# Patient Record
Sex: FEMALE | Race: WHITE | NOT HISPANIC OR LATINO | Employment: FULL TIME | URBAN - NONMETROPOLITAN AREA
[De-identification: names, ages, dates, MRNs, and addresses within clinical notes are randomized per-mention and may not be internally consistent; named-entity substitution may affect disease eponyms.]

---

## 2017-12-01 ENCOUNTER — TRANSCRIBE ORDERS (OUTPATIENT)
Dept: ADMINISTRATIVE | Facility: HOSPITAL | Age: 52
End: 2017-12-01

## 2017-12-01 DIAGNOSIS — Z12.31 VISIT FOR SCREENING MAMMOGRAM: Primary | ICD-10-CM

## 2018-01-02 ENCOUNTER — APPOINTMENT (OUTPATIENT)
Dept: MAMMOGRAPHY | Facility: HOSPITAL | Age: 53
End: 2018-01-02

## 2018-01-22 ENCOUNTER — HOSPITAL ENCOUNTER (OUTPATIENT)
Dept: MAMMOGRAPHY | Facility: HOSPITAL | Age: 53
Discharge: HOME | End: 2018-01-22
Admitting: NURSE PRACTITIONER

## 2018-01-22 DIAGNOSIS — Z12.31 VISIT FOR SCREENING MAMMOGRAM: ICD-10-CM

## 2018-01-22 PROCEDURE — 77067 SCR MAMMO BI INCL CAD: CPT

## 2018-01-22 PROCEDURE — 77063 BREAST TOMOSYNTHESIS BI: CPT | Performed by: RADIOLOGY

## 2018-01-22 PROCEDURE — 77063 BREAST TOMOSYNTHESIS BI: CPT

## 2018-01-22 PROCEDURE — 77067 SCR MAMMO BI INCL CAD: CPT | Performed by: RADIOLOGY

## 2019-02-14 ENCOUNTER — HOSPITAL ENCOUNTER (OUTPATIENT)
Dept: MAMMOGRAPHY | Facility: HOSPITAL | Age: 54
Discharge: HOME | End: 2019-02-14
Admitting: FAMILY MEDICINE

## 2019-02-14 ENCOUNTER — HOSPITAL ENCOUNTER (OUTPATIENT)
Dept: BONE DENSITY | Facility: HOSPITAL | Age: 54
Discharge: HOME | End: 2019-02-14

## 2019-02-14 DIAGNOSIS — M81.0 AGE-RELATED OSTEOPOROSIS WITHOUT CURRENT PATHOLOGICAL FRACTURE: ICD-10-CM

## 2019-02-14 DIAGNOSIS — Z12.39 SCREENING BREAST EXAMINATION: ICD-10-CM

## 2019-02-14 PROCEDURE — 77063 BREAST TOMOSYNTHESIS BI: CPT

## 2019-02-14 PROCEDURE — 77080 DXA BONE DENSITY AXIAL: CPT

## 2019-02-14 PROCEDURE — 77067 SCR MAMMO BI INCL CAD: CPT

## 2019-02-14 PROCEDURE — 77063 BREAST TOMOSYNTHESIS BI: CPT | Performed by: RADIOLOGY

## 2019-02-14 PROCEDURE — 77067 SCR MAMMO BI INCL CAD: CPT | Performed by: RADIOLOGY

## 2019-02-14 PROCEDURE — 77080 DXA BONE DENSITY AXIAL: CPT | Performed by: RADIOLOGY

## 2020-04-02 ENCOUNTER — APPOINTMENT (OUTPATIENT)
Dept: MAMMOGRAPHY | Facility: HOSPITAL | Age: 55
End: 2020-04-02

## 2020-04-02 ENCOUNTER — APPOINTMENT (OUTPATIENT)
Dept: BONE DENSITY | Facility: HOSPITAL | Age: 55
End: 2020-04-02

## 2021-07-01 ENCOUNTER — HOSPITAL ENCOUNTER (OUTPATIENT)
Dept: MAMMOGRAPHY | Facility: HOSPITAL | Age: 56
Discharge: HOME | End: 2021-07-01
Admitting: NURSE PRACTITIONER

## 2021-07-01 DIAGNOSIS — Z12.31 VISIT FOR SCREENING MAMMOGRAM: ICD-10-CM

## 2021-07-01 PROCEDURE — 77067 SCR MAMMO BI INCL CAD: CPT | Performed by: RADIOLOGY

## 2021-07-01 PROCEDURE — 77063 BREAST TOMOSYNTHESIS BI: CPT | Performed by: RADIOLOGY

## 2021-07-01 PROCEDURE — 77063 BREAST TOMOSYNTHESIS BI: CPT

## 2021-07-01 PROCEDURE — 77067 SCR MAMMO BI INCL CAD: CPT

## 2022-07-29 ENCOUNTER — APPOINTMENT (OUTPATIENT)
Dept: MAMMOGRAPHY | Facility: HOSPITAL | Age: 57
End: 2022-07-29

## 2022-07-29 ENCOUNTER — APPOINTMENT (OUTPATIENT)
Dept: BONE DENSITY | Facility: HOSPITAL | Age: 57
End: 2022-07-29

## 2022-08-05 ENCOUNTER — HOSPITAL ENCOUNTER (OUTPATIENT)
Dept: MAMMOGRAPHY | Facility: HOSPITAL | Age: 57
Discharge: HOME | End: 2022-08-05

## 2022-08-05 ENCOUNTER — HOSPITAL ENCOUNTER (OUTPATIENT)
Dept: BONE DENSITY | Facility: HOSPITAL | Age: 57
Discharge: HOME | End: 2022-08-05

## 2022-08-05 DIAGNOSIS — Z78.0 POSTMENOPAUSAL STATUS (AGE-RELATED) (NATURAL): ICD-10-CM

## 2022-08-05 DIAGNOSIS — Z12.31 VISIT FOR SCREENING MAMMOGRAM: ICD-10-CM

## 2022-08-05 PROCEDURE — 77080 DXA BONE DENSITY AXIAL: CPT

## 2022-08-05 PROCEDURE — 77063 BREAST TOMOSYNTHESIS BI: CPT

## 2022-08-05 PROCEDURE — 77080 DXA BONE DENSITY AXIAL: CPT | Performed by: RADIOLOGY

## 2022-08-05 PROCEDURE — 77067 SCR MAMMO BI INCL CAD: CPT

## 2022-08-05 PROCEDURE — 77063 BREAST TOMOSYNTHESIS BI: CPT | Performed by: RADIOLOGY

## 2022-08-05 PROCEDURE — 77067 SCR MAMMO BI INCL CAD: CPT | Performed by: RADIOLOGY

## 2023-07-27 ENCOUNTER — TRANSCRIBE ORDERS (OUTPATIENT)
Dept: ADMINISTRATIVE | Facility: HOSPITAL | Age: 58
End: 2023-07-27
Payer: COMMERCIAL

## 2023-07-27 DIAGNOSIS — Z78.0 POSTMENOPAUSAL: Primary | ICD-10-CM

## 2023-07-27 DIAGNOSIS — Z12.31 VISIT FOR SCREENING MAMMOGRAM: ICD-10-CM

## 2023-08-01 ENCOUNTER — OFFICE VISIT (OUTPATIENT)
Dept: SURGERY | Facility: CLINIC | Age: 58
End: 2023-08-01
Payer: COMMERCIAL

## 2023-08-01 VITALS
HEIGHT: 62 IN | HEART RATE: 71 BPM | DIASTOLIC BLOOD PRESSURE: 80 MMHG | WEIGHT: 124 LBS | SYSTOLIC BLOOD PRESSURE: 118 MMHG | BODY MASS INDEX: 22.82 KG/M2

## 2023-08-01 DIAGNOSIS — Z12.11 SCREENING FOR COLON CANCER: Primary | ICD-10-CM

## 2023-08-01 RX ORDER — ESTRADIOL 0.05 MG/D
PATCH TRANSDERMAL
COMMUNITY
Start: 2013-08-21

## 2023-08-01 RX ORDER — SODIUM, POTASSIUM,MAG SULFATES 17.5-3.13G
2 SOLUTION, RECONSTITUTED, ORAL ORAL TAKE AS DIRECTED
Qty: 354 ML | Refills: 0 | Status: SHIPPED | OUTPATIENT
Start: 2023-08-01

## 2023-08-01 RX ORDER — ACETAMINOPHEN AND CODEINE PHOSPHATE 300; 30 MG/1; MG/1
1 TABLET ORAL 3 TIMES DAILY
COMMUNITY
Start: 2023-07-17

## 2023-08-01 RX ORDER — CELECOXIB 200 MG/1
1 CAPSULE ORAL DAILY
COMMUNITY
Start: 2023-07-14

## 2023-08-01 RX ORDER — METHOCARBAMOL 500 MG/1
1 TABLET, FILM COATED ORAL EVERY 12 HOURS SCHEDULED
COMMUNITY
Start: 2023-06-06

## 2023-08-01 NOTE — H&P (VIEW-ONLY)
Subjective   Iva Martins is a 57 y.o. female who presents today for Initial Evaluation    Chief Complaint:    Chief Complaint   Patient presents with    Colonoscopy        History of Present Illness:    History of Present Illness Iva is a 57-year-old female who presents for screening colonoscopy.  Reports her last colonoscopy was approximately 7 to 8 years ago.  States that she did have several benign polyps removed.  Reports that she was told to return for repeat colonoscopy in 5 years.  Patient reports that she has daily bowel movements.  Denies any blood in her stool.  Denies any known family history of colon cancer.  She denies any unintentional weight loss or any changes in her bowel habits.  Patient reports that she has had an inguinal hernia repair to the left and the right in the past.  States that occasionally when she works and lifts and tugs, she will notice she has some discomfort to her left lower abdomen/pelvis area in which she believes that she may have some scar tissue.  She reports that she does not feel another inguinal hernia.  States that sometimes she feels like she has a hardness to that area when she does not have a good bowel movement.  I did discuss the possibility of obtaining a CT scan to further evaluate, patient declined.  Patient also reports she was diagnosed with a hiatal hernia in the past.  States that she does take a stomach pill for reflux when she can remember it.  She states that she also manages her reflux by staying away from certain foods.  She declined having an EGD at this time.    The following portions of the patient's history were reviewed and updated as appropriate: allergies, current medications, past family history, past medical history, past social history, past surgical history and problem list.    Past Medical History:  History reviewed. No pertinent past medical history.    Social History:  Social History     Socioeconomic History    Marital status:     Tobacco Use    Smoking status: Every Day     Packs/day: 1.00     Types: Cigarettes    Smokeless tobacco: Never   Vaping Use    Vaping Use: Some days    Substances: Nicotine, Flavoring    Devices: Pre-filled or refillable cartridge   Substance and Sexual Activity    Alcohol use: Never    Drug use: Never    Sexual activity: Defer       Family History:  Family History   Problem Relation Age of Onset    Breast cancer Neg Hx        Past Surgical History:  Past Surgical History:   Procedure Laterality Date    CYST REMOVAL      GANGLION CYST EXCISION Bilateral     HERNIA REPAIR Bilateral     different dates    HYSTERECTOMY      39    TUMOR EXCISION         Problem List:  There is no problem list on file for this patient.      Allergy:   Allergies   Allergen Reactions    Morphine Hives        Current Medications:   Current Outpatient Medications   Medication Sig Dispense Refill    acetaminophen-codeine (TYLENOL with CODEINE #3) 300-30 MG per tablet Take 1 tablet by mouth 3 (Three) Times a Day.      celecoxib (CeleBREX) 200 MG capsule Take 1 capsule by mouth Daily.      estradiol (CLIMARA) 0.05 MG/24HR patch Place  on the skin as directed by provider.      methocarbamol (ROBAXIN) 500 MG tablet Take 1 tablet by mouth Every 12 (Twelve) Hours.      sodium-potassium-magnesium sulfates (Suprep Bowel Prep Kit) 17.5-3.13-1.6 GM/177ML solution oral solution Take 2 bottles by mouth Take As Directed for 2 doses. 354 mL 0     No current facility-administered medications for this visit.       Review of Systems:    Review of Systems   Constitutional:  Negative for activity change.   HENT:  Negative for congestion.    Eyes:  Negative for blurred vision.   Respiratory:  Negative for shortness of breath.    Cardiovascular:  Negative for chest pain.   Gastrointestinal:  Negative for abdominal pain and blood in stool.   Endocrine: Negative for cold intolerance.   Genitourinary:  Negative for flank pain.   Musculoskeletal:  Negative  "for arthralgias.   Skin:  Negative for bruise.   Allergic/Immunologic: Negative for environmental allergies.   Neurological:  Negative for confusion.   Hematological:  Negative for adenopathy.   Psychiatric/Behavioral:  Negative for agitation.        Physical Exam:   Physical Exam  Constitutional:       Appearance: Normal appearance.   HENT:      Head: Normocephalic and atraumatic.      Right Ear: External ear normal.      Left Ear: External ear normal.   Eyes:      Conjunctiva/sclera: Conjunctivae normal.      Pupils: Pupils are equal, round, and reactive to light.   Cardiovascular:      Rate and Rhythm: Normal rate and regular rhythm.      Pulses: Normal pulses.   Pulmonary:      Effort: Pulmonary effort is normal.   Abdominal:      General: Abdomen is flat. There is no distension.      Palpations: Abdomen is soft.   Musculoskeletal:         General: Normal range of motion.      Cervical back: Normal range of motion and neck supple.   Skin:     General: Skin is warm and dry.      Capillary Refill: Capillary refill takes less than 2 seconds.   Neurological:      General: No focal deficit present.      Mental Status: She is alert and oriented to person, place, and time.   Psychiatric:         Mood and Affect: Mood normal.         Behavior: Behavior normal.       Vitals:  Blood pressure 118/80, pulse 71, height 157.5 cm (62.01\"), weight 56.2 kg (124 lb).   Body mass index is 22.67 kg/mý.         Assessment & Plan   Diagnoses and all orders for this visit:    1. Screening for colon cancer (Primary)  -     Case Request; Standing  -     Case Request    Other orders  -     Follow Anesthesia Guidelines / Protocol; Future  -     Follow Anesthesia Guidelines / Protocol; Standing  -     Verify / Perform Chlorhexidine Skin Prep; Standing  -     Verify / Perform Chlorhexidine Skin Prep if Indicated (If Not Already Completed); Standing  -     Provide NPO Instructions to Patient; Future  -     Chlorhexidine Skin Prep; Future  -  "    Obtain Informed Consent; Future  -     sodium-potassium-magnesium sulfates (Suprep Bowel Prep Kit) 17.5-3.13-1.6 GM/177ML solution oral solution; Take 2 bottles by mouth Take As Directed for 2 doses.  Dispense: 354 mL; Refill: 0      Iva is a 57-year-old female presents for screening colonoscopy.  She will undergo colonoscopy Dr. Castillo.  Verbalized understanding prep instructions and procedure wished to proceed.    Visit Diagnoses:    ICD-10-CM ICD-9-CM   1. Screening for colon cancer  Z12.11 V76.51         MEDS ORDERED DURING VISIT:  New Medications Ordered This Visit   Medications    sodium-potassium-magnesium sulfates (Suprep Bowel Prep Kit) 17.5-3.13-1.6 GM/177ML solution oral solution     Sig: Take 2 bottles by mouth Take As Directed for 2 doses.     Dispense:  354 mL     Refill:  0       Return for Follow-up postop.             This document has been electronically signed by MARTIN De La Paz  August 1, 2023 14:59 EDT    Please note that portions of this note were completed with a voice recognition program.

## 2023-08-09 PROBLEM — Z12.11 SCREENING FOR COLON CANCER: Status: ACTIVE | Noted: 2023-08-09

## 2023-08-11 ENCOUNTER — TELEPHONE (OUTPATIENT)
Dept: SURGERY | Facility: CLINIC | Age: 58
End: 2023-08-11
Payer: COMMERCIAL

## 2023-08-11 NOTE — TELEPHONE ENCOUNTER
Colonoscopy scheduled for Thursday 8/17/23 at 8 am. Patient to be NPO and have a  with her. Clear liquid diet and bowel prep the day prior.

## 2023-08-14 ENCOUNTER — PREP FOR SURGERY (OUTPATIENT)
Dept: OTHER | Facility: HOSPITAL | Age: 58
End: 2023-08-14
Payer: COMMERCIAL

## 2023-08-14 ENCOUNTER — TELEPHONE (OUTPATIENT)
Dept: SURGERY | Facility: CLINIC | Age: 58
End: 2023-08-14
Payer: COMMERCIAL

## 2023-08-16 ENCOUNTER — ANESTHESIA EVENT (OUTPATIENT)
Dept: PERIOP | Facility: HOSPITAL | Age: 58
End: 2023-08-16
Payer: COMMERCIAL

## 2023-08-16 RX ORDER — FAMOTIDINE 10 MG
10 TABLET ORAL DAILY
COMMUNITY

## 2023-08-16 RX ORDER — ERGOCALCIFEROL 1.25 MG/1
1 CAPSULE ORAL WEEKLY
COMMUNITY
Start: 2023-08-02

## 2023-08-17 ENCOUNTER — ANESTHESIA (OUTPATIENT)
Dept: PERIOP | Facility: HOSPITAL | Age: 58
End: 2023-08-17
Payer: COMMERCIAL

## 2023-08-17 ENCOUNTER — HOSPITAL ENCOUNTER (OUTPATIENT)
Facility: HOSPITAL | Age: 58
Setting detail: HOSPITAL OUTPATIENT SURGERY
Discharge: HOME | End: 2023-08-17
Attending: SURGERY | Admitting: SURGERY
Payer: COMMERCIAL

## 2023-08-17 VITALS
BODY MASS INDEX: 22.82 KG/M2 | HEART RATE: 54 BPM | RESPIRATION RATE: 20 BRPM | TEMPERATURE: 97.6 F | DIASTOLIC BLOOD PRESSURE: 82 MMHG | SYSTOLIC BLOOD PRESSURE: 122 MMHG | WEIGHT: 124 LBS | HEIGHT: 62 IN | OXYGEN SATURATION: 99 %

## 2023-08-17 DIAGNOSIS — Z12.11 SCREENING FOR COLON CANCER: ICD-10-CM

## 2023-08-17 PROCEDURE — 45378 DIAGNOSTIC COLONOSCOPY: CPT | Performed by: SURGERY

## 2023-08-17 PROCEDURE — 25010000002 PROPOFOL 200 MG/20ML EMULSION: Performed by: NURSE ANESTHETIST, CERTIFIED REGISTERED

## 2023-08-17 PROCEDURE — 25010000002 MIDAZOLAM PER 1 MG: Performed by: NURSE ANESTHETIST, CERTIFIED REGISTERED

## 2023-08-17 RX ORDER — MIDAZOLAM HYDROCHLORIDE 1 MG/ML
1 INJECTION INTRAMUSCULAR; INTRAVENOUS
Status: DISCONTINUED | OUTPATIENT
Start: 2023-08-17 | End: 2023-08-17 | Stop reason: HOSPADM

## 2023-08-17 RX ORDER — SODIUM CHLORIDE 9 MG/ML
40 INJECTION, SOLUTION INTRAVENOUS AS NEEDED
Status: DISCONTINUED | OUTPATIENT
Start: 2023-08-17 | End: 2023-08-17 | Stop reason: HOSPADM

## 2023-08-17 RX ORDER — LIDOCAINE HYDROCHLORIDE 20 MG/ML
INJECTION, SOLUTION EPIDURAL; INFILTRATION; INTRACAUDAL; PERINEURAL AS NEEDED
Status: DISCONTINUED | OUTPATIENT
Start: 2023-08-17 | End: 2023-08-17 | Stop reason: SURG

## 2023-08-17 RX ORDER — SODIUM CHLORIDE, SODIUM LACTATE, POTASSIUM CHLORIDE, CALCIUM CHLORIDE 600; 310; 30; 20 MG/100ML; MG/100ML; MG/100ML; MG/100ML
125 INJECTION, SOLUTION INTRAVENOUS ONCE
Status: COMPLETED | OUTPATIENT
Start: 2023-08-17 | End: 2023-08-17

## 2023-08-17 RX ORDER — MIDAZOLAM HYDROCHLORIDE 1 MG/ML
INJECTION INTRAMUSCULAR; INTRAVENOUS AS NEEDED
Status: DISCONTINUED | OUTPATIENT
Start: 2023-08-17 | End: 2023-08-17 | Stop reason: SURG

## 2023-08-17 RX ORDER — SODIUM CHLORIDE 0.9 % (FLUSH) 0.9 %
10 SYRINGE (ML) INJECTION EVERY 12 HOURS SCHEDULED
Status: DISCONTINUED | OUTPATIENT
Start: 2023-08-17 | End: 2023-08-17 | Stop reason: HOSPADM

## 2023-08-17 RX ORDER — PROPOFOL 10 MG/ML
INJECTION, EMULSION INTRAVENOUS AS NEEDED
Status: DISCONTINUED | OUTPATIENT
Start: 2023-08-17 | End: 2023-08-17 | Stop reason: SURG

## 2023-08-17 RX ORDER — SODIUM CHLORIDE 0.9 % (FLUSH) 0.9 %
10 SYRINGE (ML) INJECTION AS NEEDED
Status: DISCONTINUED | OUTPATIENT
Start: 2023-08-17 | End: 2023-08-17 | Stop reason: HOSPADM

## 2023-08-17 RX ORDER — KETOROLAC TROMETHAMINE 30 MG/ML
30 INJECTION, SOLUTION INTRAMUSCULAR; INTRAVENOUS EVERY 6 HOURS PRN
Status: DISCONTINUED | OUTPATIENT
Start: 2023-08-17 | End: 2023-08-17 | Stop reason: HOSPADM

## 2023-08-17 RX ORDER — SODIUM CHLORIDE, SODIUM LACTATE, POTASSIUM CHLORIDE, CALCIUM CHLORIDE 600; 310; 30; 20 MG/100ML; MG/100ML; MG/100ML; MG/100ML
125 INJECTION, SOLUTION INTRAVENOUS ONCE
Status: DISCONTINUED | OUTPATIENT
Start: 2023-08-17 | End: 2023-08-17 | Stop reason: HOSPADM

## 2023-08-17 RX ORDER — MEPERIDINE HYDROCHLORIDE 25 MG/ML
12.5 INJECTION INTRAMUSCULAR; INTRAVENOUS; SUBCUTANEOUS
Status: DISCONTINUED | OUTPATIENT
Start: 2023-08-17 | End: 2023-08-17 | Stop reason: HOSPADM

## 2023-08-17 RX ORDER — FENTANYL CITRATE 50 UG/ML
50 INJECTION, SOLUTION INTRAMUSCULAR; INTRAVENOUS
Status: DISCONTINUED | OUTPATIENT
Start: 2023-08-17 | End: 2023-08-17 | Stop reason: HOSPADM

## 2023-08-17 RX ORDER — ONDANSETRON 2 MG/ML
4 INJECTION INTRAMUSCULAR; INTRAVENOUS AS NEEDED
Status: DISCONTINUED | OUTPATIENT
Start: 2023-08-17 | End: 2023-08-17 | Stop reason: HOSPADM

## 2023-08-17 RX ORDER — IPRATROPIUM BROMIDE AND ALBUTEROL SULFATE 2.5; .5 MG/3ML; MG/3ML
3 SOLUTION RESPIRATORY (INHALATION) ONCE AS NEEDED
Status: DISCONTINUED | OUTPATIENT
Start: 2023-08-17 | End: 2023-08-17 | Stop reason: HOSPADM

## 2023-08-17 RX ORDER — SODIUM CHLORIDE, SODIUM LACTATE, POTASSIUM CHLORIDE, CALCIUM CHLORIDE 600; 310; 30; 20 MG/100ML; MG/100ML; MG/100ML; MG/100ML
100 INJECTION, SOLUTION INTRAVENOUS ONCE AS NEEDED
Status: DISCONTINUED | OUTPATIENT
Start: 2023-08-17 | End: 2023-08-17 | Stop reason: HOSPADM

## 2023-08-17 RX ADMIN — SODIUM CHLORIDE, POTASSIUM CHLORIDE, SODIUM LACTATE AND CALCIUM CHLORIDE: 600; 310; 30; 20 INJECTION, SOLUTION INTRAVENOUS at 08:29

## 2023-08-17 RX ADMIN — PROPOFOL 50 MG: 10 INJECTION, EMULSION INTRAVENOUS at 08:43

## 2023-08-17 RX ADMIN — PROPOFOL 50 MG: 10 INJECTION, EMULSION INTRAVENOUS at 08:38

## 2023-08-17 RX ADMIN — PROPOFOL 50 MG: 10 INJECTION, EMULSION INTRAVENOUS at 08:36

## 2023-08-17 RX ADMIN — MIDAZOLAM HYDROCHLORIDE 2 MG: 1 INJECTION, SOLUTION INTRAMUSCULAR; INTRAVENOUS at 08:29

## 2023-08-17 RX ADMIN — PROPOFOL 100 MG: 10 INJECTION, EMULSION INTRAVENOUS at 08:31

## 2023-08-17 RX ADMIN — PROPOFOL 50 MG: 10 INJECTION, EMULSION INTRAVENOUS at 08:52

## 2023-08-17 RX ADMIN — LIDOCAINE HYDROCHLORIDE 100 MG: 20 INJECTION, SOLUTION EPIDURAL; INFILTRATION; INTRACAUDAL; PERINEURAL at 08:31

## 2023-08-17 RX ADMIN — PROPOFOL 50 MG: 10 INJECTION, EMULSION INTRAVENOUS at 08:34

## 2023-08-17 RX ADMIN — PROPOFOL 50 MG: 10 INJECTION, EMULSION INTRAVENOUS at 08:48

## 2023-08-17 RX ADMIN — PROPOFOL 50 MG: 10 INJECTION, EMULSION INTRAVENOUS at 08:40

## 2023-08-17 NOTE — OP NOTE
COLONOSCOPY, ESOPHAGOGASTRODUODENOSCOPY  Procedure Note    Iva Martins  8/17/2023    Pre-op Diagnosis:   Screening for colon cancer [Z12.11]    Post-op Diagnosis: diverticulosis, tortuous colon        Procedure(s):  COLONOSCOPY  ESOPHAGOGASTRODUODENOSCOPY    Surgeon(s):  Kahlil Castillo MD    Anesthesia: Anesthesia type not filed in the log.    Staff:   Circulator: Irma Chance, KYUNG  Endo Technician: Pablo Lin; Tanya Nazario    Estimated Blood Loss: none    Specimens:                Order Name Source Comment Collection Info Order Time   TISSUE EXAM, [facility] ([facility], [facility]) Gastric, Antrum  Collected By: Kahlil Castillo MD 8/17/2023  8:37 AM     How many specimens?   1          Release to patient   Routine Release              Drains: * No LDAs found *    Procedure: The scope passed from the mouth to the duodenum. No ulcers or erosions seen.  A biopsy was done in the antrum. The GE junction and esophagus were unremarkable.   The scope was passed from the rectum to the cecum. The sigmoid was difficult to get past due to being tortuous. There is some diverticulosis but no stricture or inflammation. The ileocecal valve and terminal ileum were unremarkable. No polyps or inflammation was seen throughout the colon.     Findings: tortuous colon, diverticulosis         Complications: none   Grafts / Implants N/A    Kahlil Castillo MD     Date: 8/17/2023  Time: 09:03 EDT

## 2023-08-17 NOTE — ANESTHESIA POSTPROCEDURE EVALUATION
Patient: Iva Martins    Procedure Summary       Date: 08/17/23 Room / Location: [facility] OR [address] [place] [address] COR OR    Anesthesia Start: 0829 Anesthesia Stop: 0856    Procedures:       COLONOSCOPY      ESOPHAGOGASTRODUODENOSCOPY (Esophagus) Diagnosis:       Screening for colon cancer      (Screening for colon cancer [Z12.11])    Surgeons: Kahlil Castillo MD Provider: Clemente Munguia MD    Anesthesia Type: general ASA Status: 2            Anesthesia Type: No value filed.    Vitals  Vitals Value Taken Time   /70 08/17/23 0908   Temp 97.6 øF (36.4 øC) 08/17/23 0858   Pulse 67 08/17/23 0908   Resp 20 08/17/23 0908   SpO2 100 % 08/17/23 0908           Post Anesthesia Care and Evaluation    Patient location during evaluation: PHASE II  Patient participation: complete - patient participated  Level of consciousness: awake and alert  Pain score: 1  Pain management: adequate    Airway patency: patent  Anesthetic complications: No anesthetic complications  PONV Status: controlled  Cardiovascular status: acceptable  Respiratory status: acceptable  Hydration status: acceptable

## 2023-08-17 NOTE — ANESTHESIA PREPROCEDURE EVALUATION
Anesthesia Evaluation     history of anesthetic complications:  PONV  NPO Solid Status: > 8 hours  NPO Liquid Status: > 8 hours           Airway   Mallampati: I  TM distance: >3 FB  Neck ROM: full  No difficulty expected  Dental - normal exam     Pulmonary - normal exam   Cardiovascular - normal exam        Neuro/Psych  GI/Hepatic/Renal/Endo    (+) GERD    Musculoskeletal     Abdominal  - normal exam    Bowel sounds: normal.   Substance History      OB/GYN          Other   arthritis,                   Anesthesia Plan    ASA 2     general     intravenous induction     Anesthetic plan, risks, benefits, and alternatives have been provided, discussed and informed consent has been obtained with: patient.    CODE STATUS:

## 2023-08-18 LAB — REF LAB TEST METHOD: NORMAL

## 2024-10-24 ENCOUNTER — TRANSCRIBE ORDERS (OUTPATIENT)
Dept: ADMINISTRATIVE | Facility: HOSPITAL | Age: 59
End: 2024-10-24
Payer: COMMERCIAL

## 2024-10-24 DIAGNOSIS — Z78.0 ASYMPTOMATIC MENOPAUSAL STATE: ICD-10-CM

## 2024-10-24 DIAGNOSIS — Z12.31 VISIT FOR SCREENING MAMMOGRAM: Primary | ICD-10-CM

## 2024-11-15 ENCOUNTER — HOSPITAL ENCOUNTER (OUTPATIENT)
Dept: BONE DENSITY | Facility: HOSPITAL | Age: 59
Discharge: HOME | End: 2024-11-15
Payer: COMMERCIAL

## 2024-11-15 ENCOUNTER — HOSPITAL ENCOUNTER (OUTPATIENT)
Dept: MAMMOGRAPHY | Facility: HOSPITAL | Age: 59
Discharge: HOME | End: 2024-11-15
Payer: COMMERCIAL

## 2024-11-15 DIAGNOSIS — Z12.31 VISIT FOR SCREENING MAMMOGRAM: ICD-10-CM

## 2024-11-15 DIAGNOSIS — Z78.0 ASYMPTOMATIC MENOPAUSAL STATE: ICD-10-CM

## 2024-11-15 PROCEDURE — 77067 SCR MAMMO BI INCL CAD: CPT

## 2024-11-15 PROCEDURE — 77063 BREAST TOMOSYNTHESIS BI: CPT

## 2024-11-15 PROCEDURE — 77080 DXA BONE DENSITY AXIAL: CPT | Performed by: RADIOLOGY

## 2024-11-15 PROCEDURE — 77080 DXA BONE DENSITY AXIAL: CPT

## 2025-03-25 ENCOUNTER — OFFICE VISIT (OUTPATIENT)
Dept: SURGERY | Facility: CLINIC | Age: 60
End: 2025-03-25
Payer: COMMERCIAL

## 2025-03-25 VITALS — HEIGHT: 67 IN | BODY MASS INDEX: 16.48 KG/M2 | WEIGHT: 105 LBS

## 2025-03-25 DIAGNOSIS — R10.32 LEFT GROIN PAIN: Primary | ICD-10-CM

## 2025-03-25 PROCEDURE — 99213 OFFICE O/P EST LOW 20 MIN: CPT | Performed by: SURGERY

## 2025-03-25 NOTE — PROGRESS NOTES
Subjective   Iva Martins is a 59 y.o. female.     Chief Complaint: abdominal pain    History of Present Illness She is a 60 yo who had a left inguinal hernia repair over 10 years ago and thinks it may have come back. She has had pain in the left groin and some down the anterior left thigh. There is also a slight lump in the left femoral area    The following portions of the patient's history were reviewed and updated as appropriate: current medications, past family history, past medical history, past social history, past surgical history and problem list.    Review of Systems   Constitutional:  Negative for activity change, appetite change, chills, fever and unexpected weight change.   HENT:  Negative for congestion, facial swelling and sore throat.    Eyes:  Negative for photophobia and visual disturbance.   Respiratory:  Negative for chest tightness, shortness of breath and wheezing.    Cardiovascular:  Negative for chest pain, palpitations and leg swelling.   Gastrointestinal:  Positive for abdominal pain. Negative for abdominal distention, anal bleeding, blood in stool, constipation, diarrhea, nausea, rectal pain and vomiting.   Endocrine: Negative for cold intolerance, heat intolerance, polydipsia and polyuria.   Genitourinary:  Negative for difficulty urinating, dysuria, flank pain and urgency.   Musculoskeletal:  Negative for back pain and myalgias.   Skin:  Negative for rash and wound.   Allergic/Immunologic: Negative for immunocompromised state.   Neurological:  Negative for dizziness, seizures, syncope, light-headedness, numbness and headaches.   Hematological:  Negative for adenopathy. Does not bruise/bleed easily.   Psychiatric/Behavioral:  Negative for behavioral problems and confusion. The patient is not nervous/anxious.        Objective   Physical Exam  Vitals reviewed.   Constitutional:       General: She is not in acute distress.     Appearance: She is well-developed. She is not ill-appearing.       Comments: She is very thin and there is a small soft lump that could be a node vs possible femoral hernia.    HENT:      Head: Normocephalic. No laceration. Hair is normal.      Right Ear: Hearing and ear canal normal.      Left Ear: Hearing and ear canal normal.      Nose: Nose normal.      Right Sinus: No maxillary sinus tenderness or frontal sinus tenderness.      Left Sinus: No maxillary sinus tenderness or frontal sinus tenderness.   Eyes:      General: Lids are normal.      Conjunctiva/sclera: Conjunctivae normal.      Pupils: Pupils are equal, round, and reactive to light.   Neck:      Thyroid: No thyroid mass or thyromegaly.      Vascular: No JVD.      Trachea: No tracheal tenderness or tracheal deviation.   Cardiovascular:      Rate and Rhythm: Normal rate and regular rhythm.      Heart sounds: No murmur heard.     No gallop.   Pulmonary:      Effort: Pulmonary effort is normal.      Breath sounds: Normal breath sounds. No stridor. No wheezing.   Chest:      Chest wall: No tenderness.   Abdominal:      General: Bowel sounds are normal. There is no distension.      Palpations: Abdomen is soft. There is no mass.      Tenderness: There is no abdominal tenderness. There is no guarding or rebound.      Hernia: No hernia is present.   Musculoskeletal:         General: No deformity.      Cervical back: Normal range of motion.   Lymphadenopathy:      Cervical: No cervical adenopathy.      Upper Body:      Right upper body: No supraclavicular adenopathy.      Left upper body: No supraclavicular adenopathy.   Skin:     General: Skin is warm and dry.      Coloration: Skin is not pale.      Findings: No erythema or rash.   Neurological:      Mental Status: She is alert and oriented to person, place, and time.      Motor: No abnormal muscle tone.   Psychiatric:         Behavior: Behavior normal.         Thought Content: Thought content normal.       Past Medical History:   Diagnosis Date   • Arthritis    • GERD  (gastroesophageal reflux disease)    • PONV (postoperative nausea and vomiting)        Family History   Problem Relation Age of Onset   • Breast cancer Neg Hx        Social History     Tobacco Use   • Smoking status: Every Day     Current packs/day: 1.00     Types: Cigarettes     Passive exposure: Current   • Smokeless tobacco: Never   Vaping Use   • Vaping status: Some Days   • Substances: Nicotine, Flavoring   • Devices: Pre-filled or refillable cartridge   • Passive vaping exposure: Yes   Substance Use Topics   • Alcohol use: Never   • Drug use: Never       Past Surgical History:   Procedure Laterality Date   • COLONOSCOPY     • COLONOSCOPY N/A 8/17/2023    Procedure: COLONOSCOPY;  Surgeon: Kahlil Castillo MD;  Location: [facility];  Service: Gastroenterology;  Laterality: N/A;   • ENDOSCOPY     • ENDOSCOPY N/A 8/17/2023    Procedure: ESOPHAGOGASTRODUODENOSCOPY;  Surgeon: Kahlil Castillo MD;  Location: [facility];  Service: Gastroenterology;  Laterality: N/A;   • GANGLION CYST EXCISION Bilateral    • HYSTERECTOMY      39   • INGUINAL HERNIA REPAIR Bilateral    • OVARIAN CYST REMOVAL Right     tumor excision (6lbs)   • PILONIDAL CYSTECTOMY     • TUMOR EXCISION         Current Outpatient Medications   Medication Instructions   • acetaminophen-codeine (TYLENOL with CODEINE #3) 300-30 MG per tablet 1 tablet, 3 Times Daily   • celecoxib (CeleBREX) 200 MG capsule 1 capsule, Daily   • estradiol (CLIMARA) 0.05 MG/24HR patch Place  on the skin as directed by provider.   • famotidine (PEPCID) 10 mg, Daily   • methocarbamol (ROBAXIN) 500 MG tablet 1 tablet, Every 12 Hours Scheduled   • sodium-potassium-magnesium sulfates (Suprep Bowel Prep Kit) 17.5-3.13-1.6 GM/177ML solution oral solution 2 bottles, Oral, Take As Directed   • vitamin D (ERGOCALCIFEROL) 1.25 MG (22193 UT) capsule capsule 1 capsule, Weekly         Assessment & Plan   Diagnoses and all orders for this visit:    1. Left groin pain (Primary)    Get CT               This document has been electronically signed by Kahlil Castillo MD   March 25, 2025 11:17 EDT

## 2025-04-11 ENCOUNTER — HOSPITAL ENCOUNTER (OUTPATIENT)
Dept: CT IMAGING | Facility: HOSPITAL | Age: 60
Discharge: HOME | End: 2025-04-11
Admitting: SURGERY
Payer: COMMERCIAL

## 2025-04-11 DIAGNOSIS — R10.32 LEFT GROIN PAIN: ICD-10-CM

## 2025-04-11 LAB — CREAT BLDA-MCNC: 1 MG/DL (ref 0.6–1.3)

## 2025-04-11 PROCEDURE — 74177 CT ABD & PELVIS W/CONTRAST: CPT

## 2025-04-11 PROCEDURE — 25510000001 IOPAMIDOL 61 % SOLUTION: Performed by: SURGERY

## 2025-04-11 PROCEDURE — 82565 ASSAY OF CREATININE: CPT

## 2025-04-11 RX ORDER — IOPAMIDOL 612 MG/ML
100 INJECTION, SOLUTION INTRAVASCULAR
Status: COMPLETED | OUTPATIENT
Start: 2025-04-11 | End: 2025-04-11

## 2025-04-11 RX ADMIN — IOPAMIDOL 80 ML: 612 INJECTION, SOLUTION INTRAVENOUS at 08:44

## 2025-04-14 ENCOUNTER — OFFICE VISIT (OUTPATIENT)
Dept: SURGERY | Facility: CLINIC | Age: 60
End: 2025-04-14
Payer: COMMERCIAL

## 2025-04-14 VITALS — HEIGHT: 67 IN | WEIGHT: 105 LBS | BODY MASS INDEX: 16.48 KG/M2

## 2025-04-14 DIAGNOSIS — K59.00 CONSTIPATION, UNSPECIFIED CONSTIPATION TYPE: ICD-10-CM

## 2025-04-14 DIAGNOSIS — R10.32 LEFT GROIN PAIN: Primary | ICD-10-CM

## 2025-04-14 PROCEDURE — 99213 OFFICE O/P EST LOW 20 MIN: CPT

## 2025-04-14 RX ORDER — POLYETHYLENE GLYCOL 3350 17 G/17G
17 POWDER, FOR SOLUTION ORAL DAILY
Qty: 510 G | Refills: 11 | Status: SHIPPED | OUTPATIENT
Start: 2025-04-14

## 2025-04-14 NOTE — PROGRESS NOTES
Subjective   Iva Martins is a 59 y.o. female who presents today for Follow Up    Chief Complaint:    Chief Complaint   Patient presents with    left groin pain follow up        History of Present Illness:    History of Present Illness Iva is a 59-year-old female presents for evaluation for CT follow-up.  Dr. Castillo seen patient several weeks ago in which she was complaining of having left groin pain.  She states that after she had been pretty active she was noting left groin pain that was radiating down to the tip of her left foot.  Reports pain with certain movements.  She had been doing a lot of lifting that precluded these symptoms.  Reports normal bowel movements.  She does have a history of bilateral renal hernia repairs.    The following portions of the patient's history were reviewed and updated as appropriate: allergies, current medications, past family history, past medical history, past social history, past surgical history and problem list.    Past Medical History:  Past Medical History:   Diagnosis Date    Arthritis     GERD (gastroesophageal reflux disease)     PONV (postoperative nausea and vomiting)        Social History:  Social History     Socioeconomic History    Marital status:    Tobacco Use    Smoking status: Every Day     Current packs/day: 1.00     Types: Cigarettes     Passive exposure: Current    Smokeless tobacco: Never   Vaping Use    Vaping status: Some Days    Substances: Nicotine, Flavoring    Devices: Pre-filled or refillable cartridge    Passive vaping exposure: Yes   Substance and Sexual Activity    Alcohol use: Never    Drug use: Never    Sexual activity: Defer       Family History:  Family History   Problem Relation Age of Onset    Breast cancer Neg Hx        Past Surgical History:  Past Surgical History:   Procedure Laterality Date    COLONOSCOPY      COLONOSCOPY N/A 8/17/2023    Procedure: COLONOSCOPY;  Surgeon: Kahlil Castillo MD;  [facility];  Service:  Gastroenterology;  Laterality: N/A;    ENDOSCOPY      ENDOSCOPY N/A 8/17/2023    Procedure: ESOPHAGOGASTRODUODENOSCOPY;  Surgeon: Kahlil Castillo MD;  Location: [facility];  Service: Gastroenterology;  Laterality: N/A;    GANGLION CYST EXCISION Bilateral     HYSTERECTOMY      39    INGUINAL HERNIA REPAIR Bilateral     OVARIAN CYST REMOVAL Right     tumor excision (6lbs)    PILONIDAL CYSTECTOMY      TUMOR EXCISION         Problem List:  Patient Active Problem List   Diagnosis    Screening for colon cancer    Left groin pain       Allergy:   Allergies   Allergen Reactions    Morphine Hives        Current Medications:   Current Outpatient Medications   Medication Sig Dispense Refill    acetaminophen-codeine (TYLENOL with CODEINE #3) 300-30 MG per tablet Take 1 tablet by mouth 3 (Three) Times a Day.      celecoxib (CeleBREX) 200 MG capsule Take 1 capsule by mouth Daily.      estradiol (CLIMARA) 0.05 MG/24HR patch Place  on the skin as directed by provider.      famotidine (PEPCID) 10 MG tablet Take 1 tablet by mouth Daily.      methocarbamol (ROBAXIN) 500 MG tablet Take 1 tablet by mouth Every 12 (Twelve) Hours.      sodium-potassium-magnesium sulfates (Suprep Bowel Prep Kit) 17.5-3.13-1.6 GM/177ML solution oral solution Take 2 bottles by mouth Take As Directed for 2 doses. 354 mL 0    vitamin D (ERGOCALCIFEROL) 1.25 MG (17582 UT) capsule capsule Take 1 capsule by mouth 1 (One) Time Per Week.      polyethylene glycol (MiraLax) 17 GM/SCOOP powder Take 17 g by mouth Daily. 510 g 11     No current facility-administered medications for this visit.       Review of Systems:    Review of Systems   Constitutional:  Negative for chills, fever and unexpected weight loss.   Gastrointestinal:  Negative for constipation.         Physical Exam:   Physical Exam  Exam conducted with a chaperone present.   Constitutional:       Appearance: Normal appearance.       HENT:      Head: Normocephalic and atraumatic.      Right Ear: External  "ear normal.      Left Ear: External ear normal.   Eyes:      Conjunctiva/sclera: Conjunctivae normal.   Cardiovascular:      Pulses: Normal pulses.   Pulmonary:      Effort: Pulmonary effort is normal.   Abdominal:      General: Abdomen is flat.      Palpations: Abdomen is soft.   Musculoskeletal:         General: Normal range of motion.      Cervical back: Normal range of motion.   Skin:     General: Skin is warm and dry.      Capillary Refill: Capillary refill takes less than 2 seconds.   Neurological:      General: No focal deficit present.      Mental Status: She is alert and oriented to person, place, and time.   Psychiatric:         Mood and Affect: Mood normal.         Behavior: Behavior normal.         Vitals:  Height 170.2 cm (67.01\"), weight 47.6 kg (105 lb).   Body mass index is 16.44 kg/m².     Imaging:   CT Abdomen Pelvis With Contrast  Narrative: EXAM:    CT Abdomen and Pelvis With Intravenous Contrast     EXAM DATE:    4/11/2025 8:24 AM     CLINICAL HISTORY:    left groin pain post inguinal hernia repair; R10.32-Left lower  quadrant pain     TECHNIQUE:    Axial computed tomography images of the abdomen and pelvis with  intravenous contrast.  Sagittal and coronal reformatted images were  created and reviewed.  This CT exam was performed using one or more of  the following dose reduction techniques:  automated exposure control,  adjustment of the mA and/or kV according to patient size, and/or use of  iterative reconstruction technique.     COMPARISON:    No relevant prior studies available.     FINDINGS:    Lung bases:  Lung bases are clear.  No consolidation.      ABDOMEN:    Liver:  Unremarkable.  No mass.    Gallbladder and bile ducts:  Unremarkable.  No calcified stones.  No  ductal dilation.    Pancreas:  Unremarkable.  No mass.  No ductal dilation.    Spleen:  Unremarkable.  No splenomegaly.    Adrenals:  Unremarkable.  No mass.    Kidneys and ureters:  Unremarkable.  No solid mass.  " No  hydronephrosis.    Stomach and bowel:  Moderate volume fecal retention in the colon  consistent with constipation.  No obstruction.  No mucosal thickening.      PELVIS:    Appendix:  No findings to suggest acute appendicitis.    Bladder:  Unremarkable.  No mass.    Reproductive:  Hysterectomy.      ABDOMEN and PELVIS:    Intraperitoneal space:  Unremarkable.  No significant fluid  collection.  No pneumoperitoneum is identified.    Bones/joints:  No dislocation.  No acute osseous findings identified.    Soft tissues:  Postsurgical changes from previous inguinal hernia  repair.  No abdominal wall or inguinal hernias are noted.    Vasculature:  Unremarkable.  No abdominal aortic aneurysm.    Lymph nodes:  Unremarkable.  No enlarged lymph nodes.     Impression: 1.  No abdominal wall or inguinal hernias are noted.  2.  Postsurgical changes from previous inguinal hernia repair.  3.  Moderate volume fecal retention in the colon consistent with  constipation.  4. Other incidental/nonacute findings above.     This report was finalized on 4/11/2025 9:08 AM by Dr. Mike Tolliver MD.          Assessment & Plan   Diagnoses and all orders for this visit:    1. Left groin pain (Primary)    2. Constipation, unspecified constipation type    Other orders  -     polyethylene glycol (MiraLax) 17 GM/SCOOP powder; Take 17 g by mouth Daily.  Dispense: 510 g; Refill: 11    Iva is a 59-year-old female presents for CT follow-up.  There is no inguinal hernia noted on CT sca.  Patient does have a large stool burden.  She reports she does have frequent bowel movements.  We did discuss starting on MiraLAX as well as maybe doing mag citrate over the weekend.  Discussed ordering ultrasound of lymph node to left groin, patient declines for now.  I will discuss with Dr. Castillo tomorrow and if plan of care changes I will call and update patient.  Also discussed potential referral to pain management for nerve block she declined.,     Visit  Diagnoses:    ICD-10-CM ICD-9-CM   1. Left groin pain  R10.32 789.04   2. Constipation, unspecified constipation type  K59.00 564.00         MEDS ORDERED DURING VISIT:  New Medications Ordered This Visit   Medications    polyethylene glycol (MiraLax) 17 GM/SCOOP powder     Sig: Take 17 g by mouth Daily.     Dispense:  510 g     Refill:  11       Return if symptoms worsen or fail to improve.             This document has been electronically signed by MARTIN De La Paz  April 14, 2025 13:25 EDT    Please note that portions of this note were completed with a voice recognition program.

## 2025-05-22 ENCOUNTER — OFFICE VISIT (OUTPATIENT)
Dept: SURGERY | Facility: CLINIC | Age: 60
End: 2025-05-22
Payer: COMMERCIAL

## 2025-05-22 VITALS — HEIGHT: 67 IN | WEIGHT: 105 LBS | BODY MASS INDEX: 16.48 KG/M2

## 2025-05-22 DIAGNOSIS — L98.9 SKIN LESION: Primary | ICD-10-CM

## 2025-05-22 NOTE — PROGRESS NOTES
Subjective   Iva Martins is a 59 y.o. female.     Chief Complaint: skin lesion    History of Present Illness She is a 60 yo who has had a lump come up on the right shoulder/back area the last 2 weeks that is sore.     The following portions of the patient's history were reviewed and updated as appropriate: current medications, past family history, past medical history, past social history, past surgical history and problem list.    Review of Systems    Objective   Physical Exam1 cm raised suspicious skin lesion on right posterior medial shoulder, it was excised and sent for pathology with lidocaine and nylon sutures    Past Medical History:   Diagnosis Date    Arthritis     GERD (gastroesophageal reflux disease)     PONV (postoperative nausea and vomiting)        Family History   Problem Relation Age of Onset    Breast cancer Neg Hx        Social History     Tobacco Use    Smoking status: Every Day     Current packs/day: 1.00     Types: Cigarettes     Passive exposure: Current    Smokeless tobacco: Never   Vaping Use    Vaping status: Some Days    Substances: Nicotine, Flavoring    Devices: Pre-filled or refillable cartridge    Passive vaping exposure: Yes   Substance Use Topics    Alcohol use: Never    Drug use: Never       Past Surgical History:   Procedure Laterality Date    COLONOSCOPY      COLONOSCOPY N/A 8/17/2023    Procedure: COLONOSCOPY;  Surgeon: Kahlil Castillo MD;  Location: [facility];  Service: Gastroenterology;  Laterality: N/A;    ENDOSCOPY      ENDOSCOPY N/A 8/17/2023    Procedure: ESOPHAGOGASTRODUODENOSCOPY;  Surgeon: Kahlil Castillo MD;  Location: [facility];  Service: Gastroenterology;  Laterality: N/A;    GANGLION CYST EXCISION Bilateral     HYSTERECTOMY      39    INGUINAL HERNIA REPAIR Bilateral     OVARIAN CYST REMOVAL Right     tumor excision (6lbs)    PILONIDAL CYSTECTOMY      TUMOR EXCISION         Current Outpatient Medications   Medication Instructions    acetaminophen-codeine  (TYLENOL with CODEINE #3) 300-30 MG per tablet 1 tablet, 3 Times Daily    celecoxib (CeleBREX) 200 MG capsule 1 capsule, Daily    estradiol (CLIMARA) 0.05 MG/24HR patch Place  on the skin as directed by provider.    famotidine (PEPCID) 10 mg, Daily    methocarbamol (ROBAXIN) 500 MG tablet 1 tablet, Every 12 Hours Scheduled    polyethylene glycol (MIRALAX) 17 g, Oral, Daily    sodium-potassium-magnesium sulfates (Suprep Bowel Prep Kit) 17.5-3.13-1.6 GM/177ML solution oral solution 2 bottles, Oral, Take As Directed    vitamin D (ERGOCALCIFEROL) 1.25 MG (32022 UT) capsule capsule 1 capsule, Weekly         Assessment & Plan   Diagnoses and all orders for this visit:    1. Skin lesion (Primary)    Remove sutures in 1 wk             This document has been electronically signed by Kahlil Castillo MD   May 22, 2025 15:48 EDT

## 2025-05-23 DIAGNOSIS — L98.9 SKIN LESION: Primary | ICD-10-CM

## 2025-05-23 DIAGNOSIS — L98.9 SKIN LESION: ICD-10-CM

## 2025-05-28 LAB — REF LAB TEST METHOD: NORMAL

## 2025-05-29 ENCOUNTER — OFFICE VISIT (OUTPATIENT)
Dept: SURGERY | Facility: CLINIC | Age: 60
End: 2025-05-29
Payer: COMMERCIAL

## 2025-05-29 VITALS — WEIGHT: 105 LBS | HEIGHT: 67 IN | BODY MASS INDEX: 16.48 KG/M2

## 2025-05-29 DIAGNOSIS — L98.9 SKIN LESION: Primary | ICD-10-CM

## 2025-05-29 DIAGNOSIS — L98.9 SKIN LESION: ICD-10-CM

## 2025-05-29 DIAGNOSIS — C44.92 SQUAMOUS CELL SKIN CANCER: Primary | ICD-10-CM

## 2025-05-29 NOTE — PROGRESS NOTES
Subjective   Iva Martins is a 59 y.o. female.     Chief Complaint: post skin lesion excision    History of Present Illness She is a 60 yo who had a skin lesion excised from her shoulder. It was squamous cancer. She has a new spot on the right arm that is growing.    The following portions of the patient's history were reviewed and updated as appropriate: current medications, past family history, past medical history, past social history, past surgical history and problem list.    Review of Systems    Objective   Physical Exam wound ok, sutures removed from back of shoulder.  There is a 7 mm spot on the right forearm that was excised with lidocaine and nylon sutures.    Past Medical History:   Diagnosis Date    Arthritis     GERD (gastroesophageal reflux disease)     PONV (postoperative nausea and vomiting)        Family History   Problem Relation Age of Onset    Breast cancer Neg Hx        Social History     Tobacco Use    Smoking status: Every Day     Current packs/day: 1.00     Types: Cigarettes     Passive exposure: Current    Smokeless tobacco: Never   Vaping Use    Vaping status: Some Days    Substances: Nicotine, Flavoring    Devices: Pre-filled or refillable cartridge    Passive vaping exposure: Yes   Substance Use Topics    Alcohol use: Never    Drug use: Never       Past Surgical History:   Procedure Laterality Date    COLONOSCOPY      COLONOSCOPY N/A 8/17/2023    Procedure: COLONOSCOPY;  Surgeon: Kahlil Castillo MD;  Location: [facility];  Service: Gastroenterology;  Laterality: N/A;    ENDOSCOPY      ENDOSCOPY N/A 8/17/2023    Procedure: ESOPHAGOGASTRODUODENOSCOPY;  Surgeon: Kahlil Castillo MD;  Location: [facility];  Service: Gastroenterology;  Laterality: N/A;    GANGLION CYST EXCISION Bilateral     HYSTERECTOMY      39    INGUINAL HERNIA REPAIR Bilateral     OVARIAN CYST REMOVAL Right     tumor excision (6lbs)    PILONIDAL CYSTECTOMY      TUMOR EXCISION         Current Outpatient Medications    Medication Instructions    acetaminophen-codeine (TYLENOL with CODEINE #3) 300-30 MG per tablet 1 tablet, 3 Times Daily    celecoxib (CeleBREX) 200 MG capsule 1 capsule, Daily    estradiol (CLIMARA) 0.05 MG/24HR patch Place  on the skin as directed by provider.    famotidine (PEPCID) 10 mg, Daily    methocarbamol (ROBAXIN) 500 MG tablet 1 tablet, Every 12 Hours Scheduled    polyethylene glycol (MIRALAX) 17 g, Oral, Daily    sodium-potassium-magnesium sulfates (Suprep Bowel Prep Kit) 17.5-3.13-1.6 GM/177ML solution oral solution 2 bottles, Oral, Take As Directed    vitamin D (ERGOCALCIFEROL) 1.25 MG (77488 UT) capsule capsule 1 capsule, Weekly         Assessment & Plan   Diagnoses and all orders for this visit:    1. Squamous cell skin cancer (Primary)    Return 1 wk             This document has been electronically signed by Kahlil Castillo MD   May 29, 2025 14:53 EDT

## 2025-06-02 LAB — REF LAB TEST METHOD: NORMAL

## 2025-06-05 ENCOUNTER — OFFICE VISIT (OUTPATIENT)
Dept: SURGERY | Facility: CLINIC | Age: 60
End: 2025-06-05
Payer: COMMERCIAL

## 2025-06-05 VITALS — BODY MASS INDEX: 16.48 KG/M2 | WEIGHT: 105 LBS | HEIGHT: 67 IN

## 2025-06-05 DIAGNOSIS — C44.92 SQUAMOUS CELL SKIN CANCER: Primary | ICD-10-CM

## 2025-06-05 PROCEDURE — 99024 POSTOP FOLLOW-UP VISIT: CPT | Performed by: SURGERY

## 2025-06-05 NOTE — PROGRESS NOTES
Subjective   Iva Martins is a 59 y.o. female.     Chief Complaint: post skin lesion excision    History of Present Illness She had a right forearm skin lesion excised last week and it was a squamous skin cancer. Margins were clear.    The following portions of the patient's history were reviewed and updated as appropriate: current medications, past family history, past medical history, past social history, past surgical history and problem list.    Review of Systems    Objective   Physical Exam wound healing. Sutures removed    Past Medical History:   Diagnosis Date    Arthritis     GERD (gastroesophageal reflux disease)     PONV (postoperative nausea and vomiting)        Family History   Problem Relation Age of Onset    Breast cancer Neg Hx        Social History     Tobacco Use    Smoking status: Every Day     Current packs/day: 1.00     Types: Cigarettes     Passive exposure: Current    Smokeless tobacco: Never   Vaping Use    Vaping status: Some Days    Substances: Nicotine, Flavoring    Devices: Pre-filled or refillable cartridge    Passive vaping exposure: Yes   Substance Use Topics    Alcohol use: Never    Drug use: Never       Past Surgical History:   Procedure Laterality Date    COLONOSCOPY      COLONOSCOPY N/A 8/17/2023    Procedure: COLONOSCOPY;  Surgeon: Kahlil Castillo MD;  Location: [facility];  Service: Gastroenterology;  Laterality: N/A;    ENDOSCOPY      ENDOSCOPY N/A 8/17/2023    Procedure: ESOPHAGOGASTRODUODENOSCOPY;  Surgeon: Kahlil Castillo MD;  Location: [facility];  Service: Gastroenterology;  Laterality: N/A;    GANGLION CYST EXCISION Bilateral     HYSTERECTOMY      39    INGUINAL HERNIA REPAIR Bilateral     OVARIAN CYST REMOVAL Right     tumor excision (6lbs)    PILONIDAL CYSTECTOMY      TUMOR EXCISION         Current Outpatient Medications   Medication Instructions    acetaminophen-codeine (TYLENOL with CODEINE #3) 300-30 MG per tablet 1 tablet, 3 Times Daily    celecoxib (CeleBREX) 200  MG capsule 1 capsule, Daily    estradiol (CLIMARA) 0.05 MG/24HR patch Place  on the skin as directed by provider.    famotidine (PEPCID) 10 mg, Daily    methocarbamol (ROBAXIN) 500 MG tablet 1 tablet, Every 12 Hours Scheduled    polyethylene glycol (MIRALAX) 17 g, Oral, Daily    sodium-potassium-magnesium sulfates (Suprep Bowel Prep Kit) 17.5-3.13-1.6 GM/177ML solution oral solution 2 bottles, Oral, Take As Directed    vitamin D (ERGOCALCIFEROL) 1.25 MG (10573 UT) capsule capsule 1 capsule, Weekly         Assessment & Plan   Diagnoses and all orders for this visit:    1. Squamous cell skin cancer (Primary)    Return prn             This document has been electronically signed by Kahlil Castillo MD   June 5, 2025 14:45 EDT

## (undated) DEVICE — Device: Brand: DEFENDO AIR/WATER/SUCTION AND BIOPSY VALVE

## (undated) DEVICE — Device

## (undated) DEVICE — ENDOGATOR AUXILIARY WATER JET CONNECTOR: Brand: ENDOGATOR

## (undated) DEVICE — FRCP BX RADJAW4 NDL 2.8 240CM LG OG BX40

## (undated) DEVICE — THE BITE BLOCK MAXI, LATEX FREE STRAP IS USED TO PROTECT THE ENDOSCOPE INSERTION TUBE FROM BEING BITTEN BY THE PATIENT.

## (undated) DEVICE — ENDOGATOR TUBING FOR ENDOGATOR EGP-100 IRRIGATION PUMP,OLYMPUS OFP PUMP, OLYMPUS AFU-100 PUMP AND ERBE EIP2 PUMP: Brand: ENDOGATOR